# Patient Record
Sex: MALE | Race: ASIAN | Employment: STUDENT | URBAN - METROPOLITAN AREA
[De-identification: names, ages, dates, MRNs, and addresses within clinical notes are randomized per-mention and may not be internally consistent; named-entity substitution may affect disease eponyms.]

---

## 2023-04-24 ENCOUNTER — OFFICE VISIT (OUTPATIENT)
Dept: PRIMARY CARE | Facility: CLINIC | Age: 23
End: 2023-04-24
Payer: COMMERCIAL

## 2023-04-24 ENCOUNTER — LAB (OUTPATIENT)
Dept: LAB | Facility: LAB | Age: 23
End: 2023-04-24
Payer: COMMERCIAL

## 2023-04-24 VITALS
HEART RATE: 61 BPM | TEMPERATURE: 97.6 F | SYSTOLIC BLOOD PRESSURE: 126 MMHG | HEIGHT: 72 IN | DIASTOLIC BLOOD PRESSURE: 80 MMHG | WEIGHT: 173 LBS | OXYGEN SATURATION: 97 % | BODY MASS INDEX: 23.43 KG/M2

## 2023-04-24 DIAGNOSIS — Z02.0 SCHOOL PHYSICAL EXAM: ICD-10-CM

## 2023-04-24 DIAGNOSIS — Z02.0 SCHOOL PHYSICAL EXAM: Primary | ICD-10-CM

## 2023-04-24 LAB
HEPATITIS B VIRUS SURFACE AB (MIU/ML) IN SERUM: 44.3 MIU/ML
HEPATITIS B VIRUS SURFACE AG PRESENCE IN SERUM: NONREACTIVE
MUMPS IGG ANTIBODY: POSITIVE
RUBELLA VIRUS IGG AB: POSITIVE
RUBEOLA IGG ANTIBODY: POSITIVE
VARICELLA ZOSTER IGG: NORMAL

## 2023-04-24 PROCEDURE — 86762 RUBELLA ANTIBODY: CPT

## 2023-04-24 PROCEDURE — 99202 OFFICE O/P NEW SF 15 MIN: CPT | Performed by: STUDENT IN AN ORGANIZED HEALTH CARE EDUCATION/TRAINING PROGRAM

## 2023-04-24 PROCEDURE — 87340 HEPATITIS B SURFACE AG IA: CPT

## 2023-04-24 PROCEDURE — 36415 COLL VENOUS BLD VENIPUNCTURE: CPT

## 2023-04-24 PROCEDURE — 86735 MUMPS ANTIBODY: CPT

## 2023-04-24 PROCEDURE — 1036F TOBACCO NON-USER: CPT | Performed by: STUDENT IN AN ORGANIZED HEALTH CARE EDUCATION/TRAINING PROGRAM

## 2023-04-24 PROCEDURE — 86706 HEP B SURFACE ANTIBODY: CPT

## 2023-04-24 PROCEDURE — 86765 RUBEOLA ANTIBODY: CPT

## 2023-04-24 PROCEDURE — 86787 VARICELLA-ZOSTER ANTIBODY: CPT

## 2023-04-24 PROCEDURE — 86481 TB AG RESPONSE T-CELL SUSP: CPT

## 2023-04-24 ASSESSMENT — PAIN SCALES - GENERAL: PAINLEVEL: 0-NO PAIN

## 2023-04-24 NOTE — PROGRESS NOTES
22 year old male here to establish care with a new PCP. patient doing well with no acute complaints at this time. Patient requesting paperwork filled for dental school.    PAST MEDICAL HISTORY:  - None     PAST SURGICAL HISTORY:  - None     FAMILY HISTORY:  - Non-contributory     SOCIAL HISTORY:  Tobacco: Denies   ETOH: Denies   Drug: Denies   Sexual hx: Not active   Occupation: College Student     ALLERGIES:  - NKDA    RECENT HOSPITALIZATIONS:  - None     RECENT PROCEDURES:  - None     REVIEW OF SYSTEMS:   No fevers, chills, weight is stable  No sores, ulcers, rashes, skin lesions  No HA, SZ, syncope, stroke, TIA  No CP, chest pressure  No cough, SOB  no ABD pain  No BRBPR, melena, hematuria  No bleeding  no edema, no calf pain    10 point review of systems negative except HPI    PHYSICAL EXAMINATION:   Gen: NAD, non-toxic  HEENT: WNL  Chest: CTABL  CVS: regular without murmur  Abd: soft, NT/ND,   Ext: no edema, nontender  Skin: Dry, warm, good condition without wounds or lesions or rashes  Neuro: grossly normal, CN intact, NETO x 4  Mood and affect appropriate    ASSESSMENT /PLAN:    # Form completion for Dental school  -MMR + Varicella titers, Hep B Ag and Ab test   -TB spot test   -Will fill form after lab work is completed  -Patient to  the form after completion     Patient discussed with attending physician Dr. Sreekanth Bazzi MD  Family Medicine, PGY-2    This note was completed using Dragon voice recognition technology and may include unintended errors with respect to translation of words, typographical errors or grammar errors which may not have been identified while finalizing the chart.

## 2023-04-24 NOTE — PROGRESS NOTES
Patient was discussed with resident Jesus Bazzi MD and with attending Dr. Stack. Agreeable with plan as discussed.     Patient here for physical and forms filled out for dental school. Titers and T-spot ordered for screenings.     Torrey Oliveros MD  Family Medicine  PGY3

## 2023-04-26 LAB
NIL(NEG) CONTROL SPOT COUNT: NORMAL
PANEL A SPOT COUNT: 0
PANEL B SPOT COUNT: 0
POS CONTROL SPOT COUNT: NORMAL
T-SPOT. TB INTERPRETATION: NEGATIVE

## 2023-04-28 DIAGNOSIS — Z02.0 SCHOOL PHYSICAL EXAM: Primary | ICD-10-CM

## 2023-04-28 NOTE — PROGRESS NOTES
I reviewed with the resident the medical history and the resident’s findings on physical examination.  I discussed with the resident the patient’s diagnosis and concur with the treatment plan as documented in the resident note.     Benson Stack MD

## 2023-05-04 ENCOUNTER — CLINICAL SUPPORT (OUTPATIENT)
Dept: PRIMARY CARE | Facility: CLINIC | Age: 23
End: 2023-05-04
Payer: COMMERCIAL

## 2023-05-04 VITALS
DIASTOLIC BLOOD PRESSURE: 77 MMHG | SYSTOLIC BLOOD PRESSURE: 120 MMHG | TEMPERATURE: 98.3 F | OXYGEN SATURATION: 96 % | BODY MASS INDEX: 23.46 KG/M2 | HEART RATE: 63 BPM | HEIGHT: 72 IN

## 2023-05-04 DIAGNOSIS — Z02.0 SCHOOL PHYSICAL EXAM: ICD-10-CM

## 2023-05-04 PROCEDURE — 90471 IMMUNIZATION ADMIN: CPT | Performed by: STUDENT IN AN ORGANIZED HEALTH CARE EDUCATION/TRAINING PROGRAM

## 2023-05-04 PROCEDURE — 90715 TDAP VACCINE 7 YRS/> IM: CPT | Performed by: STUDENT IN AN ORGANIZED HEALTH CARE EDUCATION/TRAINING PROGRAM

## 2023-05-04 ASSESSMENT — PAIN SCALES - GENERAL: PAINLEVEL: 0-NO PAIN
